# Patient Record
Sex: MALE
[De-identification: names, ages, dates, MRNs, and addresses within clinical notes are randomized per-mention and may not be internally consistent; named-entity substitution may affect disease eponyms.]

---

## 2024-02-01 ENCOUNTER — APPOINTMENT (OUTPATIENT)
Dept: ORTHOPEDIC SURGERY | Facility: CLINIC | Age: 39
End: 2024-02-01
Payer: COMMERCIAL

## 2024-02-01 VITALS — BODY MASS INDEX: 20.4 KG/M2 | WEIGHT: 130 LBS | HEIGHT: 67 IN

## 2024-02-01 DIAGNOSIS — Z78.9 OTHER SPECIFIED HEALTH STATUS: ICD-10-CM

## 2024-02-01 PROBLEM — Z00.00 ENCOUNTER FOR PREVENTIVE HEALTH EXAMINATION: Status: ACTIVE | Noted: 2024-02-01

## 2024-02-01 PROCEDURE — 99204 OFFICE O/P NEW MOD 45 MIN: CPT | Mod: 25

## 2024-02-01 PROCEDURE — 73502 X-RAY EXAM HIP UNI 2-3 VIEWS: CPT

## 2024-02-01 RX ORDER — MELOXICAM 15 MG/1
15 TABLET ORAL
Qty: 30 | Refills: 1 | Status: ACTIVE | COMMUNITY
Start: 2024-02-01 | End: 1900-01-01

## 2024-02-01 NOTE — IMAGING
[de-identified] : General: NAD, A&Ox3 Gait: Non-antalgic, no Trendelenburg Foot Progression: neutral Knee Progression: neutral   L Hip Exam: Pain with Log Roll - negative Flexion: 110 Pain with Hyperflexion - yes FADIR - pos AARON - neg Extension: 20 Flexion Contracture - none Prone Apprehension Test - neg Prone Rotation Test - symmetric Ischial tenderness - none Trochanteric tenderness - none   Abduction - 4 /5, pain - yes Adduction - 5 /5 Supine resisted SLR - 5 /5, pain - no Seated resisted hip flexion - 5 /5, pain - no Dorsiflexion 5/5 Plantarflexion 5/5 EHL 5/5 DP/PT 2+, foot is warm and well perfused        Leg Lengths: symmetric    [Left] : left hip with pelvis [All Views] : anteroposterior, lateral

## 2024-02-01 NOTE — HISTORY OF PRESENT ILLNESS
[6] : 6 [1] : 2 [Dull/Aching] : dull/aching [Localized] : localized [Occasional] : occasional [Sitting] : sitting [Walking] : walking [Exercising] : exercising [Stairs] : stairs [de-identified] : Pt is a 38 year old male presenting with left hip pain beginning 5 years ago w/o injury.  Pain progressively worsening within time.    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  Pain is located anterior hip.   Injury - no Mechanical symptoms - yes Exacerbating factors/activities/positions - running, long walks Pain during or after activity - both Back pain - no Radicular pain - no   Previous Treatment: NSAIDs: no PT: no Activity Mods: yes Surgery: no   Injections: no Date of last injection: [ ] Numbing phase relief: [ ] Steroid phase relief: [ ]     Occupation: works for non-profit Sports/Recreational Activities: running [] : no [FreeTextEntry1] : left hip pain

## 2024-02-01 NOTE — DISCUSSION/SUMMARY
[de-identified] : EMMETT POWELL has L hip impingement.  They have not yet failed non-operative treatment which includes:     1.  NSAIDs - these help to calm inflammation in your hip and can relieve pain.  They should be taken as directed and with food to help avoid an upset stomach.  Consult with your primary care physician if there is any concern over whether NSAIDs are compatible with your other medications or medical conditions.   2.  Physical Therapy - physical therapy helps to build up the muscles around your hip joint which helps to stabilize the joint and your pelvis which can relieve pain.  Physical therapy will also help to strengthen your core muscles which help to stabilize your pelvis and subsequently your hip joint.   3.  Activity Modifications - if possible, avoiding activities and positions that cause hip pain can help to reduce inflammation in your hip joint and reduce hip pain   4.  Injections - occasionally a steroid injection into your hip joint can be used to help relieve pain.  Deferred today    The patient's current medication management of their orthopedic diagnosis was reviewed today: (1) We discussed a comprehensive treatment plan that included possible pharmaceutical management involving the use of prescription strength medications including but not limited to options such as oral Naprosyn 500mg BID, once daily Meloxicam 15 mg, or 500-650 mg Tylenol versus over the counter oral medications and topical prescription NSAID Pennsaid vs over the counter Voltaren gel. (2) There is a moderate risk of morbidity with further treatment, especially from use of prescription strength medications and possible side effects of these medications which include upset stomach with oral medications, skin reactions to topical medications and cardiac/renal issues with long term use. (3) I recommended that the patient follow-up with their medical physician to discuss any significant specific potential issues with long term medication use such as interactions with current medications or with exacerbation of underlying medical comorbidities. (4) The benefits and risks associated with use of injectable, oral or topical, prescription and over the counter anti-inflammatory medications were discussed with the patient. The patient voiced understanding of the risks including but not limited to bleeding, stroke, kidney dysfunction, heart disease, and were referred to the black box warning label for further information.   Prior to appointment and during encounter with patient extensive medical records were reviewed including but not limited to, hospital records, out patient records, imaging results, and lab data. During this appointment the patient was examined, diagnoses were discussed and explained in a face to face manner. In addition extensive time was spent reviewing aforementioned diagnostic studies. Counseling including abnormal image results, differential diagnoses, treatment options, risk and benefits, lifestyle changes, current condition, and current medications was performed. Patient's comments, questions, and concerns were address and patient verbalized understanding.

## 2024-03-14 ENCOUNTER — APPOINTMENT (OUTPATIENT)
Dept: ORTHOPEDIC SURGERY | Facility: CLINIC | Age: 39
End: 2024-03-14
Payer: COMMERCIAL

## 2024-03-14 DIAGNOSIS — M25.852 OTHER SPECIFIED JOINT DISORDERS, LEFT HIP: ICD-10-CM

## 2024-03-14 PROCEDURE — 99213 OFFICE O/P EST LOW 20 MIN: CPT

## 2024-03-14 NOTE — IMAGING
[Left] : left hip with pelvis [All Views] : anteroposterior, lateral [de-identified] : General: NAD, A&Ox3 Gait: Non-antalgic, no Trendelenburg Foot Progression: neutral Knee Progression: neutral   L Hip Exam: Pain with Log Roll - negative Flexion: 110 Pain with Hyperflexion - yes FADIR - pos AARON - neg Extension: 20 Flexion Contracture - none Prone Apprehension Test - neg Prone Rotation Test - symmetric Ischial tenderness - none Trochanteric tenderness - none   Abduction - 4 /5, pain - yes Adduction - 5 /5 Supine resisted SLR - 5 /5, pain - no Seated resisted hip flexion - 5 /5, pain - no Dorsiflexion 5/5 Plantarflexion 5/5 EHL 5/5 DP/PT 2+, foot is warm and well perfused        Leg Lengths: symmetric

## 2024-03-14 NOTE — HISTORY OF PRESENT ILLNESS
[3] : 3 [0] : 0 [Dull/Aching] : dull/aching [Localized] : localized [Occasional] : occasional [Rest] : rest [Exercising] : exercising [Walking] : walking [Stairs] : stairs [de-identified] : Pt is a 38 year old male presenting with left hip pain beginning 5 years ago w/o injury.  Pain progressively worsening within time.    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  Pain is located anterior hip.   Injury - no Mechanical symptoms - yes Exacerbating factors/activities/positions - running, long walks Pain during or after activity - both Back pain - no Radicular pain - no   Previous Treatment: NSAIDs: no PT: no Activity Mods: yes Surgery: no   Injections: no Date of last injection: [ ] Numbing phase relief: [ ] Steroid phase relief: [ ]     Occupation: works for non-profit Sports/Recreational Activities: running  03/14/2024: EMMETT is here today for LEFT hip follow up. pain decreased since last visit. c/o tightness > pain. attending therapy. pt states he weaned off of medication due to stomach discomfort.  [] : no [FreeTextEntry1] : left hip pain [de-identified] : physical therapy

## 2024-03-14 NOTE — DISCUSSION/SUMMARY
[de-identified] : EMMETT POWELL has L hip impingement.  He is significantly improved with physical therapy and his meloxicam.  He did have some side effects from meloxicam and so he discontinued it.  Did  him that he can resume it if he gets back to running and has pain.  We discussed return to running plan including both treadmill and exercise bike warm-ups and a good dynamic warm up.  If he still has pain after he gets back to running he will give me a call we will get an MRI of his hip.  All questions were answered he is in agreement with this plan.